# Patient Record
Sex: FEMALE | Race: WHITE
[De-identification: names, ages, dates, MRNs, and addresses within clinical notes are randomized per-mention and may not be internally consistent; named-entity substitution may affect disease eponyms.]

---

## 2018-03-06 ENCOUNTER — HOSPITAL ENCOUNTER (EMERGENCY)
Dept: HOSPITAL 41 - JD.ED | Age: 43
Discharge: HOME | End: 2018-03-06
Payer: COMMERCIAL

## 2018-03-06 VITALS — DIASTOLIC BLOOD PRESSURE: 89 MMHG | SYSTOLIC BLOOD PRESSURE: 158 MMHG

## 2018-03-06 DIAGNOSIS — N81.10: ICD-10-CM

## 2018-03-06 DIAGNOSIS — F32.9: ICD-10-CM

## 2018-03-06 DIAGNOSIS — Z91.09: ICD-10-CM

## 2018-03-06 DIAGNOSIS — N20.2: Primary | ICD-10-CM

## 2018-03-06 DIAGNOSIS — Z88.5: ICD-10-CM

## 2018-03-06 DIAGNOSIS — Z79.84: ICD-10-CM

## 2018-03-06 PROCEDURE — 96375 TX/PRO/DX INJ NEW DRUG ADDON: CPT

## 2018-03-06 PROCEDURE — 99284 EMERGENCY DEPT VISIT MOD MDM: CPT

## 2018-03-06 PROCEDURE — 96361 HYDRATE IV INFUSION ADD-ON: CPT

## 2018-03-06 PROCEDURE — 96374 THER/PROPH/DIAG INJ IV PUSH: CPT

## 2018-03-06 PROCEDURE — 74176 CT ABD & PELVIS W/O CONTRAST: CPT

## 2018-03-06 NOTE — EDM.PDOC
ED HPI GENERAL MEDICAL PROBLEM





- General


Chief Complaint: Abdominal Pain


Stated Complaint: LOWER R ABDOMINAL PAIN


Time Seen by Provider: 03/06/18 21:10


Source of Information: Reports: Patient, Family (spouse)


History Limitations: Reports: No Limitations





- History of Present Illness


INITIAL COMMENTS - FREE TEXT/NARRATIVE: 





42-year-old female presents to the ED with sudden onset of severe right upper 

quadrant abdominal pain rating to the right flank area. Associated nausea 

vomiting and feeling of need to void but unable to do so. Pain came on suddenly 

about 1915 hrs. tonight. No history of previous kidney stones. Previous 

abdominal surgeries that of a laparoscopic cholecystectomy. Does have a 

cystocele as well which is being considered for repair. Associated nausea 

without any vomiting yet. No family history of renal lithiasis.


Onset: Today, Sudden


Onset Date: 03/06/18


Onset Time: 19:15


Duration: Minutes:


Location: Reports: Abdomen (Right flank pain), Back


Quality: Reports: Ache


Severity: Severe (strong colicky component to the pain.)


Improves with: Reports: None ( Current pain 8 out of 10)


Worsens with: Reports: None


Context: Reports: Other (Sudden spontaneous occurrence of pain).  Denies: 

Activity, Exercise, Lifting, Sick Contact, Trauma


Associated Symptoms: Reports: Loss of Appetite, Malaise, Nausea/Vomiting (

Nausea without vomiting)


Treatments PTA: Reports: Other (see below) (She took a nasal tablet thinking 

she might have a urinary tract infection.)


  ** Right Lower Abdomen


Pain Score (Numeric/FACES): 10





- Related Data


 Allergies











Allergy/AdvReac Type Severity Reaction Status Date / Time


 


morphine Allergy Intermediate Itching Verified 06/06/16 14:33


 


seasonal Allergy  Sneezing Uncoded 06/06/16 14:33











Home Meds: 


 Home Meds





Levonorgestrel-Ethin Estradiol [Aviane-28 Tablet] 1 tab PO DAILY 06/03/16 [

History]


Ondansetron [Zofran] 4 mg BUCCAL Q6H PRN #5 tab 03/06/18 [Rx]


metFORMIN [Glucophage XR] 500 mg PO DAILY 03/06/18 [History]


oxyCODONE HCl/Acetaminophen [Percocet 5-325 mg Tablet] 1 - 2 each PO Q4H PRN #

12 tablet 03/06/18 [Rx]











Past Medical History


Gastrointestinal History: Reports: Chronic Diarrhea, GERD


OB/GYN History: Reports: Dysfunctional Uterine Bleeding, Other (See Below)


Other OB/BYN History: vaginal repair after first baby born


Psychiatric History: Reports: Anxiety, Depression


Endocrine/Metabolic History: Reports: Other (See Below)


Other Endocrine/Metabolic History: pre diabetic


Dermatologic History: Reports: Benign Melanoma





- Past Surgical History


GI Surgical History: Reports: Cholecystectomy


Dermatological Surgical History: Reports: Skin Biopsy





Social & Family History





- Tobacco Use


Smoking Status *Q: Never Smoker


Used Tobacco, but Quit: No


Second Hand Smoke Exposure: No





- Caffeine Use


Caffeine Use: Reports: Coffee





- Recreational Drug Use


Recreational Drug Use: No


Drug Use in Last 12 Months: No





- Living Situation & Occupation


Living situation: Reports: 


Occupation: Employed





ED ROS GENERAL





- Review of Systems


Review Of Systems: See Below


Constitutional: Reports: Decreased Appetite.  Denies: Fever, Chills, Malaise, 

Weakness, Fatigue, Weight Loss


HEENT: Reports: No Symptoms


Respiratory: Reports: No Symptoms


Cardiovascular: Reports: No Symptoms


Endocrine: Reports: No Symptoms


GI/Abdominal: Reports: No Symptoms, Abdominal Pain (Right upper mid abdomen. 

Pain is constant with a colicky component.)


: Reports: Other (Feeling of need to void but unable to do so. Has a known 

history of cystocele.)


Musculoskeletal: Reports: Back Pain (Right flank pressure discomfort.)


Skin: Reports: No Symptoms


Neurological: Reports: No Symptoms


Psychiatric: Reports: No Symptoms


Hematologic/Lymphatic: Reports: No Symptoms


Immunologic: Reports: No Symptoms





ED EXAM, RENAL/





- Physical Exam


Exam: See Below


Exam Limited By: No Limitations


General Appearance: Alert, WD/WN, Moderate Distress


Eye Exam: Bilateral Eye: Normal Inspection


Respiratory/Chest: No Respiratory Distress, Lungs Clear, Normal Breath Sounds, 

No Accessory Muscle Use, Respiratory Distress


Cardiovascular: Normal Peripheral Pulses, Regular Rate, Rhythm, No Gallop, No 

Murmur (Mild tachypnea at rest but lungs are completely clear. O2 sats 100%)


GI/Abdominal: Normal Bowel Sounds, Soft, Non-Tender, No Organomegaly, No 

Distention, No Abnormal Bruit (Decreased bowel sounds were normal.), No Mass, 

Pelvis Stable, Abnormal Bowel Sounds.  No: Guarding, Rigid, Rebound, Tender


Back Exam: Normal Inspection.  No: CVA Tenderness (L), CVA Tenderness (R)


Extremities: Normal Inspection, Normal Range of Motion, Non-Tender, No Pedal 

Edema


Neurological: Alert, Oriented, CN II-XII Intact, Normal Cognition


Psychiatric: Normal Affect, Normal Mood


Skin Exam: Warm, Dry, Intact, Other (Color is rather cool and pale.)





Course





- Vital Signs


Last Recorded V/S: 


 Last Vital Signs











Temp  35.9 C   03/06/18 20:52


 


Pulse  75   03/06/18 20:52


 


Resp  20   03/06/18 20:52


 


BP  158/89 H  03/06/18 20:52


 


Pulse Ox  100   03/06/18 20:52














- Orders/Labs/Meds


Orders: 


 Active Orders 24 hr











 Category Date Time Status


 


 Abdomen Pelvis wo Cont [CT] Stat Exams  03/06/18 21:11 Taken


 


 URINALYSIS W/MICROSCOPIC [UA W/MICROSCOPIC] [URIN] Stat Lab  03/06/18 21:11 

Ordered


 


 Ketorolac [Toradol] Med  03/06/18 21:15 Active





 30 mg IVPUSH ONETIME   


 


 Sodium Chloride 0.9% [Normal Saline] 1,000 ml Med  03/06/18 21:15 Active





 IV ASDIRECTED   








 Medication Orders





Sodium Chloride (Normal Saline)  1,000 mls @ 150 mls/hr IV ASDIRECTED ANGELES


   Last Admin: 03/06/18 21:27  Dose: 150 mls/hr


Ketorolac Tromethamine (Toradol)  30 mg IVPUSH ONETIME ANGELES


   Last Admin: 03/06/18 21:28  Dose: 30 mg








Meds: 


Medications











Generic Name Dose Route Start Last Admin





  Trade Name Freq  PRN Reason Stop Dose Admin


 


Sodium Chloride  1,000 mls @ 150 mls/hr  03/06/18 21:15  03/06/18 21:27





  Normal Saline  IV   150 mls/hr





  ASDIRECTED ANGELES   Administration


 


Ketorolac Tromethamine  30 mg  03/06/18 21:15  03/06/18 21:28





  Toradol  IVPUSH   30 mg





  ONETIME ANGELES   Administration














Discontinued Medications














Generic Name Dose Route Start Last Admin





  Trade Name Freq  PRN Reason Stop Dose Admin


 


Hydromorphone HCl  1 mg  03/06/18 21:10  03/06/18 21:28





  Dilaudid  IVPUSH  03/06/18 21:11  1 mg





  ONETIME ONE   Administration


 


Metoclopramide HCl  7.5 mg  03/06/18 21:10  03/06/18 21:28





  Reglan  IVPUSH  03/06/18 21:11  7.5 mg





  ONETIME ONE   Administration














- Radiology Interpretation


Free Text/Narrative:: 





42-year-old female presents to the ED with acute onset of right flank pain 

right upper abdominal pain. This started about 4 after 7 tonight. Associated 

nausea without vomiting yet. Associated feeling of need to void but unable to 

do so. Clinically she has a signs symptoms of a renal stone. Previous similar 

history. Plan CT abdomen and pelvis per renal protocol. IV normal saline at 150 

mils per hour. Given Toradol 30 mg IV with Dilaudid 1 mg IV and Reglan 7.5 mg 

IV for acute pain and nausea relief. Analysis will be collected if one becomes 

available.





- Re-Assessments/Exams


Free Text/Narrative Re-Assessment/Exam: 





03/06/18 22:24  CT scan of the abdomen and pelvis confirms clinical suspicion 

of a right renal stone. There is a 2 mm stone wedged at the UVJ which looks 

like it's partially into the urinary bladder. It is our a high-grade 

obstruction with markedly dilated renal pelvis with some perinephric stranding 

and mild associated hydronephrosis. Is one small 1 mm stone within the right 

renal parenchyma that may be problematic in the future. I can see no stones 

within the left renal parenchyma. Gallbladder is absent liver looks healthy 

pancreas looks good abdomen glands look normal values appreciated in the pelvis 

or in the bowel. Pain is pretty well gone. She'll be discharged home with a 

urinary strainer to catch the stone if at all possible. She will be given 

Percocet 5/325 milligram tablets 12. One or 2 every 4-6 hours for pain relief 

as needed. Stone is likely to pass in the next 24-48 hours. She will also be 

given Zofran 4 mg sublingually 5 tablets for nausea relief if needed. Both of 

these medications were also written through the [facility] machine.








03/06/18 22:49 patient indicates she is booked for a total bowel hysterectomy 

next Tuesday. I see no problem with her ability to proceed with that surgery at 

this time due to current illness.








Departure





- Departure


Time of Disposition: 22:49


Disposition: Home, Self-Care 01


Condition: Fair


Clinical Impression: 


 Renal colic on right side








- Discharge Information


Prescriptions: 


Ondansetron [Zofran] 4 mg BUCCAL Q6H PRN #5 tab


 PRN Reason: nausea or vomiting


oxyCODONE HCl/Acetaminophen [Percocet 5-325 mg Tablet] 1 - 2 each PO Q4H PRN #

12 tablet


 PRN Reason: pain relief. 


Referrals: 


Tonya Hernandez NP [Primary Care Provider] - 


Forms:  ED Department Discharge, ED Return to Work/School Form


Additional Instructions: 


Evaluation the emergent tonight in regards to acute onset of severe right flank 

pain and right upper abdominal pain. This is secondary to a right kidney stone 

which is wedged at the juncture of the ureter and the urinary bladder. Is a 

partially 2 mm in size on the CT scan. There is a 1 mm stone embedded within 

the right kidney tissue that may become problematic in the future Stone did 

cause a high-grade obstruction with markedly dilated right ureter and swelling 

of the right kidney. This will resolve on its own once the stone passes. Strain 

the urine until you Stone is caught and identified to have passed. They use 

Zofran 4 mg under the tongue every 4-6 hours necessary for further nausea or 

vomiting relief. Percocet tabs 5/3/25 one or 2 tablets every 4-6 hours needed 

for relief of pain. You could also take Motrin 600 mg every 6 hours for pain 

and inflammation relief as well. Given to excuse her from the workplace 

tomorrow unless  the stone happens to pass overnight.





- My Orders


Last 24 Hours: 


My Active Orders





03/06/18 21:11


Abdomen Pelvis wo Cont [CT] Stat 


URINALYSIS W/MICROSCOPIC [UA W/MICROSCOPIC] [URIN] Stat 





03/06/18 21:15


Ketorolac [Toradol]   30 mg IVPUSH ONETIME 


Sodium Chloride 0.9% [Normal Saline] 1,000 ml IV ASDIRECTED 














- Assessment/Plan


Last 24 Hours: 


My Active Orders





03/06/18 21:11


Abdomen Pelvis wo Cont [CT] Stat 


URINALYSIS W/MICROSCOPIC [UA W/MICROSCOPIC] [URIN] Stat 





03/06/18 21:15


Ketorolac [Toradol]   30 mg IVPUSH ONETIME 


Sodium Chloride 0.9% [Normal Saline] 1,000 ml IV ASDIRECTED

## 2018-03-07 NOTE — CT
CT abdomen and pelvis

 

Technique: Multiple axial sections were obtained from above the dome 

of the diaphragm inferiorly through the pubic symphysis.  Intravenous 

and oral contrast was not utilized.  Study has been performed as a 

ureteral stone protocol.

 

Findings: Right ureter is mildly dilated.  This finding is due to an 

obstructing 3 mm stone located within the distal right ureter at the 

UVJ.  No other abnormal calcifications are seen along the course of 

the ureters.

 

Two small nonobstructing calculi are seen within the right kidney.  

Single small nonobstructing stone is noted within the left kidney.

 

Visualized lung bases show nothing acute.  Noncontrast appearance of 

the liver and spleen appears within normal limits.  Surgical clips are

 seen from prior cholecystectomy.  Adrenal glands show no nodule.  

Pancreas is within normal limits.  Aorta shows no aneurysmal 

dilatation.  No retroperitoneal adenopathy or mesenteric abnormalities

 are seen.  No pelvic mass or adenopathy is seen.  No free fluid is 

seen.  No bowel dilatation is identified.

 

Bone window settings were reviewed which appear within normal limits 

for the patient's age.

 

Impression:

1.  Dilated right ureter secondary to distal right ureteral stone 

measuring around 3 mm.  This is located at the UVJ.

2.  Small nonobstructing calculi within both kidneys.

3.  No additional abnormality is appreciated on noncontrast CT study 

of the abdomen and pelvis performed as a ureteral stone protocol.

 

Diagnostic code #3

 

Agree with preliminary report issued by [facility] ([facility] 

preliminary report dictated on 03/06/18, 11:03 PM Central Time)

## 2018-03-13 ENCOUNTER — HOSPITAL ENCOUNTER (OUTPATIENT)
Dept: HOSPITAL 41 - JD.SDS | Age: 43
Discharge: HOME | End: 2018-03-13
Attending: OBSTETRICS & GYNECOLOGY
Payer: COMMERCIAL

## 2018-03-13 VITALS — SYSTOLIC BLOOD PRESSURE: 106 MMHG | DIASTOLIC BLOOD PRESSURE: 92 MMHG

## 2018-03-13 DIAGNOSIS — D25.1: Primary | ICD-10-CM

## 2018-03-13 DIAGNOSIS — N87.9: ICD-10-CM

## 2018-03-13 DIAGNOSIS — J30.2: ICD-10-CM

## 2018-03-13 DIAGNOSIS — R73.03: ICD-10-CM

## 2018-03-13 DIAGNOSIS — Z79.84: ICD-10-CM

## 2018-03-13 DIAGNOSIS — Z79.899: ICD-10-CM

## 2018-03-13 DIAGNOSIS — N72: ICD-10-CM

## 2018-03-13 PROCEDURE — 58262 VAG HYST INCLUDING T/O: CPT

## 2018-03-13 PROCEDURE — 86901 BLOOD TYPING SEROLOGIC RH(D): CPT

## 2018-03-13 PROCEDURE — 87086 URINE CULTURE/COLONY COUNT: CPT

## 2018-03-13 PROCEDURE — 85025 COMPLETE CBC W/AUTO DIFF WBC: CPT

## 2018-03-13 PROCEDURE — 81025 URINE PREGNANCY TEST: CPT

## 2018-03-13 PROCEDURE — 86900 BLOOD TYPING SEROLOGIC ABO: CPT

## 2018-03-13 PROCEDURE — 86850 RBC ANTIBODY SCREEN: CPT

## 2018-03-13 PROCEDURE — 57240 ANTERIOR COLPORRHAPHY: CPT

## 2018-03-13 PROCEDURE — 36415 COLL VENOUS BLD VENIPUNCTURE: CPT

## 2018-03-13 NOTE — PCM.OPNOTE
- General Post-Op/Procedure Note


Date of Surgery/Procedure: 03/13/18


Operative Procedure(s): Total vaginal hysterectomy with bilateral salpingectomy

, anterior repair


Findings: 





Overall normal-appearing uterus, cervix, tubes, bilateral ovaries and visible 

portions of the bowel


Pre Op Diagnosis: Midline cystocele with irregular menses


Post-Op Diagnosis: Same


Anesthesia Technique: General ET Tube


Primary Surgeon: Guero Jones


Anesthesia Provider: Ismael Stephens


Assistant: Andres Fry


Reason Assistant Was Necessary: 





Retraction and patient safety


Role of Assistant: 





Retraction of tissue during surgery for proper visualization


Pathology: 





Cervix, uterus, bilateral fallopian tubes


Fluid Replacement, Intraop: 1,500


Output, Urine Amount: 50


EBL in mLs: 50


Condition: Good


Free Text/Narrative:: 





Procedure in detail: The patient was seen in the preoperative holding area and 

risks, benefits, indications, and alternatives of the procedure were reviewed 

with the patient and she desired to proceed with a total vaginal hysterectomy, 

bilateral salpingectomy, anterior repair, possible bilateral salpingo-

oophorectomy, possible posterior repair, possible laparoscopic-assisted vaginal 

hysterectomy, possible total abdominal hysterectomy.  Consents were reviewed.  





The patient was given general anesthesia with an endotracheal tube that was 

placed without difficulty.  The patient was placed in dorsal lithotomy position

, prepped, and draped in normal sterile fashion.  A weighted speculum was 

placed into the vagina and the anterior lip of the cervix was grasped with the 

single-tooth tenaculum.  A second single-tooth tenaculum was used to grasp the 

entirety of the cervix.  The cervix was injected circumferentially with 0.25% 

lidocaine with epinephrine.  The cervix was circumferentially incised with a 

scalpel.  The bladder was dissected off the pubovesical cervical fascia 

anteriorly with Hernández scissors. The posterior cul-de-sac was entered sharply 

without difficulty using Hernández scissors.  A Joe clamp was placed over the 

uterosacral ligaments on the patient's left side.  These were transected and 

suture ligated with 0-Monocryl suture.  This was repeated on the patient's 

right side.  Hemostasis was assured.  The anterior cul-de-sac was entered 

sharply using Hernández scissors.   The cardinal ligaments were then clamped on both 

sides with LigaSure vessel sealer device, coagulated and ligated using the 

device.  The uterine arteries and broad ligament were then serially clamped 

with LigaSure vessel sealer device, coagulated and ligated using the device.  

Excellent hemostasis was visualized.  The cornua were clamped and ligated using 

LigaSure device on the patient's left side.  The cornua was clamped on the 

patient's right side with Joe clamps, transected, and the uterus delivered.  

These pedicles were suture ligated using 0-Monocryl with excellent hemostasis.  

The right fallopian tube segments were grasped using a Yrn clamp and 

ligated using the LigaSure vessel sealer device.  This was repeated on the 

patient's left side using a Joe clamp and suture.  Hemostasis was assured.  

The posterior vaginal cuff was closed with running locked sutures of 0-

Monocryl. 





Attention was then turned to the patient's anterior vaginal wall where there 

was noted a grade 2 cystocele.  A triangular portion of the anterior vaginal 

wall was excised using a scalpel from the patient's tissue with the point of 

the triangle portion approximately 2 cm from the urethral meatus.  The 

underlying pubovesical fascia was then dissected from the overlying vaginal 

mucosa using blunt and sharp dissection with Metzenbaum scissors.  The 

pubovesical fascia was then sutured together using box sutures of 0 Monocryl.  

The vaginal mucosal incision was then closed using 3-0 Monocryl suture in a 

running locked fashion.  The vaginal cuff was then closed in a vertical fashion 

using interrupted figure-of-eight sutures with 0 Monocryl suture.  





The procedure was complete at this time and hemostasis was assured.  The 

patient was awoken and taken to the PACU for recovery in stable condition.





Sponge, lap, needle, and instrument counts were correct x 2.  





Patient will be discharged to home once she is meeting all postoperative 

milestones including ambulation, tolerating small amounts of regular diet, 

voiding, pain controlled with oral medications and minimal bleeding.  She will 

follow up in 2 weeks or earlier as needed.

## 2018-03-13 NOTE — PCM48HPAN
Post Anesthesia Note





- EVALUATION WITHIN 48HRS OF ANESTHETIC


Vital Signs in Normal Range: Yes


Patient Participated in Evaluation: Yes


Respiratory Function Stable: Yes


Airway Patent: Yes


Cardiovascular Function Stable: Yes


Hydration Status Stable: Yes


Pain Control Satisfactory: Yes


Nausea and Vomiting Control Satisfactory: Yes


Mental Status Recovered: Yes


Pulse Rate: 80


Resp Rate: 16


Temperature: 37.0 C


Blood Pressure: 106/92

## 2018-03-13 NOTE — PCM.PREANE
Preanesthetic Assessment





- Procedure


Proposed Procedure: 





TVH with BS, anterior repair





- Anesthesia/Transfusion/Family Hx


Anesthesia History: Prior Anesthesia Without Reaction


Family History of Anesthesia Reaction: No


Transfusion History: No Prior Transfusion(s)


Type of Transfusion Reactions: Reports: Unknown





- Review of Systems


General: No Symptoms


Pulmonary: No Symptoms


Cardiovascular: No Symptoms


Gastrointestinal: No Symptoms


Neurological: No Symptoms


Other: Reports: Diabetes (pre diabetic )





- Physical Assessment


NPO Status Date: 03/12/18


NPO Status Time: 20:30


O2 Sat by Pulse Oximetry: 98


Respiratory Rate: 16


Vital Signs: 





 Last Vital Signs











Temp  36.2 C   03/13/18 06:55


 


Pulse  81   03/13/18 06:55


 


Resp  16   03/13/18 06:55


 


BP  129/86   03/13/18 06:55


 


Pulse Ox  98   03/13/18 06:55











Height: 1.7 m


Weight: 108.862 kg


ASA Class: 2


Mental Status: Alert & Oriented x3


Airway Class: Mallampati = 1


Dentition: Reports: Normal Dentition (3)


Thyro-Mental Finger Breadths: 3


Mouth Opening Finger Breadths: 3


ROM/Head Extension: Full


Lungs: Clear to Auscultation, Normal Respiratory Effort


Cardiovascular: Regular Rate, Regular Rhythm





- Allergies


Allergies/Adverse Reactions: 


 Allergies











Allergy/AdvReac Type Severity Reaction Status Date / Time


 


morphine Allergy Intermediate Itching Verified 06/06/16 14:33


 


seasonal Allergy  Sneezing Uncoded 06/06/16 14:33














- Blood


Blood Available: No


Product(s) Available: None





- Anesthesia Plan


Pre-Op Medication Ordered: None





- Acknowledgements


Anesthesia Type Planned: General Anesthesia


Pt an Appropriate Candidate for the Planned Anesthesia: Yes


Alternatives and Risks of Anesthesia Discussed w Pt/Guardian: Yes


Pt/Guardian Understands and Agrees with Anesthesia Plan: Yes





PreAnesthesia Questionnaire


HEENT History: Reports: None


Cardiovascular History: Reports: None


Respiratory History: Reports: None


Gastrointestinal History: Reports: Cholelithiasis, Chronic Diarrhea, GERD


Genitourinary History: Reports: None


OB/GYN History: Reports: Dysfunctional Uterine Bleeding, Other (See Below)


Other OB/BYN History: irregular menses, cystocele


Musculoskeletal History: Reports: None


Neurological History: Reports: None


Psychiatric History: Reports: Anxiety, Depression


Endocrine/Metabolic History: Reports: Diabetes, Type II


Other Endocrine/Metabolic History: pre diabetic


Hematologic History: Reports: None


Immunologic History: Reports: None


Oncologic (Cancer) History: Reports: None


Dermatologic History: Reports: Benign Melanoma





- Past Surgical History


Head Surgeries/Procedures: Reports: None


HEENT Surgical History: Reports: None


Cardiovascular Surgical History: Reports: None


Respiratory Surgical History: Reports: None


GI Surgical History: Reports: Cholecystectomy, Colonoscopy, EGD


Female  Surgical History: Reports: None


Male  Surgical History: Reports: None


Endocrine Surgical History: Reports: None


Neurological Surgical History: Reports: None


Musculoskeletal Surgical History: Reports: None


Oncologic Surgical History: Reports: None


Dermatological Surgical History: Reports: Skin Biopsy





- SUBSTANCE USE


Smoking Status *Q: Never Smoker


Tobacco Use Within Last Twelve Months: No


Second Hand Smoke Exposure: No


Recreational Drug Use History: No





- HOME MEDS


Home Medications: 


 Home Meds





Levonorgestrel-Ethin Estradiol [Aviane-28 Tablet] 1 tab PO DAILY 06/03/16 [

History]


Ondansetron [Zofran] 4 mg BUCCAL Q6H PRN #5 tab 03/06/18 [Rx]


metFORMIN [Glucophage XR] 500 mg PO DAILY 03/06/18 [History]


oxyCODONE HCl/Acetaminophen [Percocet 5-325 mg Tablet] 1 - 2 each PO Q4H PRN #

12 tablet 03/06/18 [Rx]











- CURRENT (IN HOUSE) MEDS


Current Meds: 





 Current Medications





Lactated Ringer's (Ringers, Lactated)  1,000 mls @ 125 mls/hr IV ASDIRECTED ANGELES


   Stop: 03/13/18 23:00


   Last Admin: 03/13/18 07:15 Dose:  125 mls/hr


Lidocaine/Sodium Bicarbonate (Buffered Lidocaine 1% In Ns 8.4%)  0.25 ml IDERM 

ONETIME PRN


   PRN Reason: Prior to IV Start


   Stop: 03/13/18 18:00


   Last Admin: 03/13/18 07:14 Dose:  0.25 ml


Sodium Chloride (Saline Flush)  10 ml FLUSH ASDIRECTED PRN


   PRN Reason: Keep Vein Open


   Stop: 03/13/18 18:00





Discontinued Medications





Cefazolin Sodium (Ancef) Confirm Administered Dose 2 gm .ROUTE .STK-MED ONE


   Stop: 03/13/18 07:32


Fentanyl (Sublimaze) Confirm Administered Dose 250 mcg .ROUTE .STK-MED ONE


   Stop: 03/13/18 07:26


Lidocaine HCl (Xylocaine-Mpf 1%) Confirm Administered Dose 4 mls @ as directed 

.ROUTE .[employer]-MED ONE


   Stop: 03/13/18 07:32


Ketamine HCl (Ketalar) Confirm Administered Dose 500 mg .ROUTE .[employer]-MED ONE


   Stop: 03/13/18 07:26


Lidocaine/Epinephrine (Xylocaine 1% With Epinephrine 1:100,000) Confirm 

Administered Dose 20 ml .ROUTE .[facility]-MED ONE


   Stop: 03/13/18 07:00


Midazolam HCl (Versed 1 Mg/Ml) Confirm Administered Dose 2 mg .ROUTE .[employer]-MED 

ONE


   Stop: 03/13/18 07:26


Ondansetron HCl (Zofran) Confirm Administered Dose 4 mg .ROUTE .[employer]


   Stop: 03/13/18 07:32


Propofol (Diprivan  20 Ml) Confirm Administered Dose 200 mg .ROUTE .[employer]-MED ONE


   Stop: 03/13/18 07:26


Rocuronium Bromide (Zemuron) Confirm Administered Dose 50 mg .ROUTE .[employer]-MED ONE


   Stop: 03/13/18 07:32


Sodium Chloride (Normal Saline) Confirm Administered Dose 50 ml .ROUTE .[employer]-MED 

ONE


   Stop: 03/13/18 07:00

## 2018-03-13 NOTE — PCM.POSTAN
POST ANESTHESIA ASSESSMENT





- MENTAL STATUS


Mental Status: Alert, Oriented





- VITAL SIGNS


Pulse Rate: 80


SaO2: 92


Resp Rate: 17


Blood Pressure: 106/92


Temperature: 37.0 C





- RESPIRATORY


Respiratory Status: Respiratory Rate WNL, Airway Patent, O2 Saturation Stable, 

Supplemental Oxygen





- CARDIOVASCULAR


CV Status: Pulse Rate WNL, Blood Pressure Stable





- GASTROINTESTINAL


GI Status: No Symptoms





- PAIN


Pain Score: 0





- POST OP HYDRATION


Hydration Status: Adequate & Stable

## 2020-09-13 NOTE — EDM.PDOC
ED HPI GENERAL MEDICAL PROBLEM





- General


Chief Complaint: Flank Pain


Stated Complaint: FLANK PAIN


Time Seen by Provider: 09/13/20 05:20


Source of Information: Reports: Patient


History Limitations: Reports: No Limitations





- History of Present Illness


INITIAL COMMENTS - FREE TEXT/NARRATIVE: 





This is a 45-year-old female.  Onset of right flank pain about 10 PM yesterday 

night.  It was sudden onset and sometimes radiates into the right lower 

quadrant.  There is no blood noted in the urine.  She did have some nausea but 

no vomiting and no fever.  She took some oxycodone around 3 AM this morning but 

it did not seem to help so she comes to the ER for evaluation.  She does have a 

history of kidney stones in the past but only x1.  No other acute symptoms.


  ** Right Lower Flank


Pain Score (Numeric/FACES): 7





- Related Data


                                    Allergies











Allergy/AdvReac Type Severity Reaction Status Date / Time


 


seasonal Allergy  Sneezing Uncoded 09/13/20 05:13











Home Meds: 


                                    Home Meds





Ibuprofen 600 mg PO Q6H PRN #60 tablet 03/13/18 [Rx]


oxyCODONE HCl/Acetaminophen [Percocet 5-325 mg Tablet] 1 - 2 each PO Q6H PRN #30

tablet 03/13/18 [Rx]











Past Medical History


HEENT History: Reports: None


Cardiovascular History: Reports: None


Respiratory History: Reports: None


Gastrointestinal History: Reports: Chronic Diarrhea, GERD


Genitourinary History: Reports: None, Renal Calculus


OB/GYN History: Reports: Pregnancy


Other OB/GYN History: vaginal repair after first baby born


Musculoskeletal History: Reports: None


Neurological History: Reports: None


Psychiatric History: Reports: Anxiety, Depression


Endocrine/Metabolic History: Reports: Obesity/BMI 30+


Other Endocrine/Metabolic History: pre diabetic


Hematologic History: Reports: None


Immunologic History: Reports: None


Oncologic (Cancer) History: Reports: None


Dermatologic History: Reports: Benign Melanoma





- Past Surgical History


Head Surgeries/Procedures: Reports: None


HEENT Surgical History: Reports: None


Cardiovascular Surgical History: Reports: None


Respiratory Surgical History: Reports: None


GI Surgical History: Reports: Cholecystectomy


Female  Surgical History: Reports: None


Neurological Surgical History: Reports: None


Musculoskeletal Surgical History: Reports: None


Oncologic Surgical History: Reports: None


Dermatological Surgical History: Reports: Skin Biopsy





Social & Family History





- Tobacco Use


Smoking Status *Q: Never Smoker





- Caffeine Use


Caffeine Use: Reports: Coffee





- Recreational Drug Use


Recreational Drug Use: No





- Living Situation & Occupation


Living situation: Reports: 


Occupation: Employed





ED ROS GENERAL





- Review of Systems


Review Of Systems: See Below


Constitutional: Denies: Fever, Chills


HEENT: Reports: No Symptoms


Respiratory: Denies: Shortness of Breath, Cough


Cardiovascular: Reports: No Symptoms


Endocrine: Reports: No Symptoms


GI/Abdominal: Reports: Nausea.  Denies: Abdominal Pain, Diarrhea, Vomiting


: Reports: Flank Pain.  Denies: Dysuria


Skin: Reports: No Symptoms


Neurological: Reports: No Symptoms


Psychiatric: Reports: No Symptoms


Hematologic/Lymphatic: Reports: No Symptoms





ED EXAM, GI/ABD





- Physical Exam


Exam: See Below


Exam Limited By: No Limitations


General Appearance: Alert, WD/WN, No Apparent Distress


Eyes: Bilateral: Normal Appearance


Ears: Normal External Exam


Head: Normocephalic


Neck: Supple


Respiratory/Chest: No Respiratory Distress, Lungs Clear, Normal Breath Sounds


Cardiovascular: Regular Rate, Rhythm, No Murmur


GI/Abdominal Exam: Soft, Non-Tender


Back Exam: Full Range of Motion, Other (Some mild tenderness in the right flank 

on palpation.)


Extremities: Normal Inspection, Normal Range of Motion


Neurological: Alert, Oriented


Psychiatric: Normal Affect, Normal Mood


Skin Exam: Warm, Dry





Course





- Vital Signs


Last Recorded V/S: 


                                Last Vital Signs











Temp  96.7 F L  09/13/20 05:14


 


Pulse  72   09/13/20 05:14


 


Resp  17   09/13/20 05:14


 


BP  161/95 H  09/13/20 05:14


 


Pulse Ox  98   09/13/20 05:14














- Orders/Labs/Meds


Orders: 


                               Active Orders 24 hr











 Category Date Time Status


 


 Abdomen Pelvis wo Cont [CT] Stat Exams  09/13/20 05:24 Taken


 


 UA W/MICROSCOPIC [URIN] Stat Lab  09/13/20 05:24 Ordered


 


 Sodium Chloride 0.9% [Normal Saline] 1,000 ml Med  09/13/20 05:30 Active





 IV ASDIRECTED   








                                Medication Orders





Sodium Chloride (Normal Saline)  1,000 mls @ 1,000 mls/hr IV ASDIRECTED ANGELES


   Last Admin: 09/13/20 05:39  Dose: 1,000 mls/hr


   Documented by: LJ








Labs: 


                                Laboratory Tests











  09/13/20 Range/Units





  05:40 


 


WBC  6.89  (3.98-10.04)  K/mm3


 


RBC  4.22  (3.98-5.22)  M/mm3


 


Hgb  12.4  (11.2-15.7)  gm/dl


 


Hct  38.4  (34.1-44.9)  %


 


MCV  91.0  (79.4-94.8)  fl


 


MCH  29.4  (25.6-32.2)  pg


 


MCHC  32.3  (32.2-35.5)  g/dl


 


RDW Std Deviation  42.7  (36.4-46.3)  fL


 


Plt Count  330  (182-369)  K/mm3


 


MPV  9.2 L  (9.4-12.3)  fl


 


Neut % (Auto)  56.2  (34.0-71.1)  %


 


Lymph % (Auto)  31.3  (19.3-51.7)  %


 


Mono % (Auto)  8.6  (4.7-12.5)  %


 


Eos % (Auto)  3.2  (0.7-5.8)  


 


Baso % (Auto)  0.4  (0.1-1.2)  %


 


Neut # (Auto)  3.87  (1.56-6.13)  K/mm3


 


Lymph # (Auto)  2.16  (1.18-3.74)  K/mm3


 


Mono # (Auto)  0.59 H  (0.24-0.36)  K/mm3


 


Eos # (Auto)  0.22  (0.04-0.36)  K/mm3


 


Baso # (Auto)  0.03  (0.01-0.08)  K/mm3











Meds: 


Medications











Generic Name Dose Route Start Last Admin





  Trade Name Freq  PRN Reason Stop Dose Admin


 


Sodium Chloride  1,000 mls @ 1,000 mls/hr  09/13/20 05:30  09/13/20 05:39





  Normal Saline  IV   1,000 mls/hr





  ASDIRECTED ANGELES   Administration














Discontinued Medications














Generic Name Dose Route Start Last Admin





  Trade Name Freq  PRN Reason Stop Dose Admin


 


Hydromorphone HCl  0.5 mg  09/13/20 05:26  09/13/20 05:40





  Dilaudid  IVPUSH  09/13/20 05:27  0.5 mg





  ONETIME ONE   Administration


 


Ketorolac Tromethamine  30 mg  09/13/20 05:25  09/13/20 05:40





  Toradol  IVPUSH  09/13/20 05:26  30 mg





  ONETIME ONE   Administration


 


Ondansetron HCl  4 mg  09/13/20 05:25  09/13/20 05:39





  Zofran  IVPUSH  09/13/20 05:26  4 mg





  ONETIME ONE   Administration














- Radiology Interpretation


Free Text/Narrative:: 





CT scan of the abdomen shows a 4 m stone in the distal ureter some moderate 

right hydronephrosis.  No other acute findings.





- Re-Assessments/Exams


Free Text/Narrative Re-Assessment/Exam: 





09/13/20 06:17


I spoke to the patient regarding the CT scan results.  She says it feels like 

the pain is moved a little bit further down.  It is tolerable.  I will still 

give her a little more pain medicine before she goes.  I will also provide a 

urine strainer for her.





Departure





- Departure


Time of Disposition: 06:18


Disposition: Home, Self-Care 01


Condition: Good


Clinical Impression: 


 Right ureteral stone, Renal colic on right side








- Discharge Information


*PRESCRIPTION DRUG MONITORING PROGRAM REVIEWED*: Not Applicable


*COPY OF PRESCRIPTION DRUG MONITORING REPORT IN PATIENT STEFANO: Not Applicable


Instructions:  Kidney Stones, Easy-to-Read


Referrals: 


Candace Vazquez, MARELY [Primary Care Provider] - 


Forms:  ED Department Discharge, ED Return to Work/School Form


Additional Instructions: 


Take the pain medication and the medicine for nausea as needed, drink lots of 

fluids and strain your urine every time you urinate to capture the stone, if 

there is marked worsening of your symptoms then return to the ER, follow-up with

your family doctor later this week for recheck.





Sepsis Event Note (ED)





- Evaluation


Sepsis Screening Result: No Definite Risk





- Focused Exam


Vital Signs: 


                                   Vital Signs











  Temp Pulse Resp BP Pulse Ox


 


 09/13/20 05:14  96.7 F L  72  17  161/95 H  98














- My Orders


Last 24 Hours: 


My Active Orders





09/13/20 05:24


Abdomen Pelvis wo Cont [CT] Stat 


UA W/MICROSCOPIC [URIN] Stat 





09/13/20 05:30


Sodium Chloride 0.9% [Normal Saline] 1,000 ml IV ASDIRECTED 














- Assessment/Plan


Last 24 Hours: 


My Active Orders





09/13/20 05:24


Abdomen Pelvis wo Cont [CT] Stat 


UA W/MICROSCOPIC [URIN] Stat 





09/13/20 05:30


Sodium Chloride 0.9% [Normal Saline] 1,000 ml IV ASDIRECTED

## 2020-09-13 NOTE — CT
CT abdomen and pelvis

 

Technique: Multiple axial sections were obtained from above the dome 

of the diaphragm inferiorly through the pubic symphysis.  Intravenous 

and oral contrast was not utilized.  Study has been performed as a 

ureteral stone protocol.  Reconstructed coronal and sagittal images 

were obtained.

 

Findings: Distal right ureteral obstructing stone is seen measuring 

around 6 mm.  This causes proximal hydronephrosis of the right ureter 

and kidney.  No left ureteral calcifications are seen.  No renal 

calculi are seen.

 

Visualized lung bases show nothing acute.

 

Noncontrast appearance of the liver shows no discrete abnormality.  

Surgical clips are seen from previous cholecystectomy.  Spleen appears

 normal.  Adrenal glands show no nodule.  Pancreas shows no discrete 

abnormality.  Aorta shows no aneurysm.  No retroperitoneal adenopathy 

or mesenteric abnormalities are seen.  No pelvic mass or adenopathy is

 seen.  No free fluid or inflammatory change is appreciated.

 

Appendix not definitely visualized.

 

Bone window settings were reviewed which appear within normal limits 

for the patient's age.

 

Impression:

1.  Right-sided hydronephrosis caused by a 6 mm distal right ureteral 

stone.

2.  Previous cholecystectomy.

3.  No other acute finding is seen on noncontrast CT study of the 

abdomen and pelvis.

 

Diagnostic code #3

 

This report was dictated in MDT

 

I agree with preliminary report from [facility], finalized on 09/13/20, 7:04

 AM Central Daylight Time